# Patient Record
Sex: MALE | URBAN - METROPOLITAN AREA
[De-identification: names, ages, dates, MRNs, and addresses within clinical notes are randomized per-mention and may not be internally consistent; named-entity substitution may affect disease eponyms.]

---

## 2017-11-28 ENCOUNTER — APPOINTMENT (RX ONLY)
Dept: URBAN - METROPOLITAN AREA CLINIC 87 | Facility: CLINIC | Age: 32
Setting detail: DERMATOLOGY
End: 2017-11-28

## 2017-11-28 DIAGNOSIS — L70.0 ACNE VULGARIS: ICD-10-CM

## 2017-11-28 PROBLEM — F41.9 ANXIETY DISORDER, UNSPECIFIED: Status: ACTIVE | Noted: 2017-11-28

## 2017-11-28 PROBLEM — I63.50 CEREBRAL INFARCTION DUE TO UNSPECIFIED OCCLUSION OR STENOSIS OF UNSPECIFIED CEREBRAL ARTERY: Status: ACTIVE | Noted: 2017-11-28

## 2017-11-28 PROBLEM — G40.909 EPILEPSY, UNSPECIFIED, NOT INTRACTABLE, WITHOUT STATUS EPILEPTICUS: Status: ACTIVE | Noted: 2017-11-28

## 2017-11-28 PROBLEM — Z92.3 PERSONAL HISTORY OF IRRADIATION: Status: ACTIVE | Noted: 2017-11-28

## 2017-11-28 PROCEDURE — ? ORDER TESTS

## 2017-11-28 PROCEDURE — ? COUNSELING

## 2017-11-28 PROCEDURE — ? PRESCRIPTION

## 2017-11-28 PROCEDURE — ? OTHER

## 2017-11-28 PROCEDURE — 99202 OFFICE O/P NEW SF 15 MIN: CPT

## 2017-11-28 PROCEDURE — ? TREATMENT REGIMEN

## 2017-11-28 RX ORDER — ADAPALENE AND BENZOYL PEROXIDE 3; 25 MG/G; MG/G
GEL TOPICAL
Qty: 1 | Refills: 2 | Status: ERX | COMMUNITY
Start: 2017-11-28

## 2017-11-28 RX ORDER — CLINDAMYCIN PHOSPHATE 10 MG/1
SWAB TOPICAL
Qty: 1 | Refills: 4 | Status: ERX | COMMUNITY
Start: 2017-11-28

## 2017-11-28 RX ADMIN — CLINDAMYCIN PHOSPHATE: 10 SWAB TOPICAL at 16:51

## 2017-11-28 RX ADMIN — ADAPALENE AND BENZOYL PEROXIDE: 3; 25 GEL TOPICAL at 16:51

## 2017-11-28 ASSESSMENT — LOCATION ZONE DERM
LOCATION ZONE: TRUNK
LOCATION ZONE: FACE

## 2017-11-28 ASSESSMENT — LOCATION SIMPLE DESCRIPTION DERM
LOCATION SIMPLE: LEFT UPPER BACK
LOCATION SIMPLE: CHEST
LOCATION SIMPLE: RIGHT CHEEK

## 2017-11-28 ASSESSMENT — LOCATION DETAILED DESCRIPTION DERM
LOCATION DETAILED: RIGHT MEDIAL INFERIOR CHEST
LOCATION DETAILED: LEFT SUPERIOR LATERAL UPPER BACK
LOCATION DETAILED: RIGHT CENTRAL MALAR CHEEK
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
## 2017-11-28 NOTE — PROCEDURE: TREATMENT REGIMEN
Detail Level: Zone
Initiate Treatment: Epiduo Forte gel apply sparingly at night, Clinda P swab to face/back/chest every morning
Continue Regimen: Bactrim as recommended by PCP
Plan: Ok to continue BP wash 1-2 daily  and Bactrim abx as recommended by PCP, Dr. Evans for cosmetic treatments-name and number given

## 2020-02-29 ENCOUNTER — HOSPITAL ENCOUNTER (EMERGENCY)
Dept: HOSPITAL 97 - ER | Age: 35
Discharge: HOME | End: 2020-02-29
Payer: COMMERCIAL

## 2020-02-29 VITALS — SYSTOLIC BLOOD PRESSURE: 168 MMHG | DIASTOLIC BLOOD PRESSURE: 105 MMHG

## 2020-02-29 VITALS — OXYGEN SATURATION: 100 % | TEMPERATURE: 98.5 F

## 2020-02-29 DIAGNOSIS — S23.3XXA: ICD-10-CM

## 2020-02-29 DIAGNOSIS — F17.210: ICD-10-CM

## 2020-02-29 DIAGNOSIS — Z88.5: ICD-10-CM

## 2020-02-29 DIAGNOSIS — S13.4XXA: Primary | ICD-10-CM

## 2020-02-29 LAB
ALBUMIN SERPL BCP-MCNC: 4.5 G/DL (ref 3.4–5)
ALP SERPL-CCNC: 97 U/L (ref 45–117)
ALT SERPL W P-5'-P-CCNC: 22 U/L (ref 12–78)
AST SERPL W P-5'-P-CCNC: 15 U/L (ref 15–37)
BUN BLD-MCNC: 16 MG/DL (ref 7–18)
GLUCOSE SERPLBLD-MCNC: 123 MG/DL (ref 74–106)
HCT VFR BLD CALC: 45.2 % (ref 39.6–49)
LYMPHOCYTES # SPEC AUTO: 1.2 K/UL (ref 0.7–4.9)
PMV BLD: 8.4 FL (ref 7.6–11.3)
POTASSIUM SERPL-SCNC: 4.1 MMOL/L (ref 3.5–5.1)
RBC # BLD: 4.95 M/UL (ref 4.33–5.43)

## 2020-02-29 PROCEDURE — 85025 COMPLETE CBC W/AUTO DIFF WBC: CPT

## 2020-02-29 PROCEDURE — 99284 EMERGENCY DEPT VISIT MOD MDM: CPT

## 2020-02-29 PROCEDURE — 36415 COLL VENOUS BLD VENIPUNCTURE: CPT

## 2020-02-29 PROCEDURE — 96375 TX/PRO/DX INJ NEW DRUG ADDON: CPT

## 2020-02-29 PROCEDURE — 72125 CT NECK SPINE W/O DYE: CPT

## 2020-02-29 PROCEDURE — 96374 THER/PROPH/DIAG INJ IV PUSH: CPT

## 2020-02-29 PROCEDURE — 72128 CT CHEST SPINE W/O DYE: CPT

## 2020-02-29 PROCEDURE — 70450 CT HEAD/BRAIN W/O DYE: CPT

## 2020-02-29 PROCEDURE — 80076 HEPATIC FUNCTION PANEL: CPT

## 2020-02-29 PROCEDURE — 80048 BASIC METABOLIC PNL TOTAL CA: CPT

## 2020-02-29 NOTE — EDPHYS
Physician Documentation                                                                           

 [facility]                                                                 

Name: Burke Silvestre                                                                              

Age: 34 yrs                                                                                       

Sex: Male                                                                                         

: 1985                                                                                   

MRN: [id number]                                                                                   

Arrival Date: 2020                                                                          

Time: 19:21                                                                                       

Account#: [id number]                                                                            

Bed 4                                                                                             

Private MD:                                                                                       

ED Physician Benjamin Mcgregor                                                                     

HPI:                                                                                              

                                                                                             

02:06 This 34 yrs old  Male presents to ER via Wheelchair with complaints of Neck    tw4 

      Injury, Possible Stroke.                                                                    

02:06 The patient or guardian complains of decreased range of motion, pain, that is acute.    tw4 

      The symptoms are located at the cervical spine. Onset: The symptoms/episode                 

      began/occurred just prior to arrival, today. Context: The problem was sustained at          

      home. Associated signs and symptoms: The patient has no apparent associated signs or        

      symptoms. The pain radiates to the left trapezius and left scapular area. Modifying         

      factors: The symptoms are alleviated by nothing. the symptoms are aggravated by             

      movement. The patient has not experienced similar symptoms in the past.                     

02:11 Pt has a history of chronic neck pain.                                                  tw4 

                                                                                                  

Historical:                                                                                       

- Allergies:                                                                                      

                                                                                             

20:09 Toradol;                                                                                ea  

                                                                                                  

- Immunization history:: Adult Immunizations up to date.                                          

- Social history:: Smoking status: Patient reports the use of cigarette tobacco                   

  products, smokes one-half pack cigarettes per day.                                              

                                                                                                  

                                                                                                  

ROS:                                                                                              

                                                                                             

02:06 Constitutional: Negative for fever, chills, and weight loss, Eyes: Negative for injury, tw4 

      pain, redness, and discharge, Cardiovascular: Negative for chest pain, palpitations,        

      and edema, Respiratory: Negative for shortness of breath, cough, wheezing, and              

      pleuritic chest pain, Abdomen/GI: Negative for abdominal pain, nausea, vomiting,            

      diarrhea, and constipation, Back: Negative for injury and pain, MS/Extremity: Negative      

      for injury and deformity, Skin: Negative for injury, rash, and discoloration.               

      Neck: Positive for pain with movement, pain at rest, Negative for injury or acute           

      deformity, mass.                                                                            

      Neuro: Positive for weakness.                                                               

                                                                                                  

Exam:                                                                                             

02:06 Constitutional:  This is a well developed, well nourished patient who is awake, alert,  tw4 

      and in no acute distress. Eyes:  Pupils equal round and reactive to light, extra-ocular     

      motions intact.  Lids and lashes normal.  Conjunctiva and sclera are non-icteric and        

      not injected.  Cornea within normal limits.  Periorbital areas with no swelling,            

      redness, or edema. Chest/axilla:  Normal chest wall appearance and motion.  Nontender       

      with no deformity.  No lesions are appreciated. Cardiovascular:  Regular rate and           

      rhythm with a normal S1 and S2.  No gallops, murmurs, or rubs.  Normal PMI, no JVD.  No     

      pulse deficits. Respiratory:  Lungs have equal breath sounds bilaterally, clear to          

      auscultation and percussion.  No rales, rhonchi or wheezes noted.  No increased work of     

      breathing, no retractions or nasal flaring. Abdomen/GI:  Soft, non-tender, with normal      

      bowel sounds.  No distension or tympany.  No guarding or rebound.  No evidence of           

      tenderness throughout. Back:  No spinal tenderness.  No costovertebral tenderness.          

      Full range of motion. MS/ Extremity:  Pulses equal, no cyanosis.  Neurovascular intact.     

       Full, normal range of motion. Neuro:  Awake and alert, GCS 15, oriented to person,         

      place, time, and situation.  Cranial nerves II-XII grossly intact.  Motor strength 5/5      

      in all extremities.  Sensory grossly intact.  Cerebellar exam normal.  Normal gait.         

02:06 Neck: External neck: tenderness, that is moderate, of the  left mid cervical area,          

      right mid cervical area and lower cervical area.                                            

                                                                                                  

Vital Signs:                                                                                      

                                                                                             

20:22  / 113; Pulse 91; Resp 18; Temp 98.5; Pulse Ox 100% on R/A; Pain 10/10;           jd3 

20:38  / 112; Pulse 85; Resp 18; Pulse Ox 100% on R/A;                                  mg2 

20:41 Pain 8/10;                                                                              jd3 

22:26  / 105; Pulse 85; Resp 17 S; Pulse Ox 100% on R/A;                                jd3 

                                                                                                  

MDM:                                                                                              

19:34 Patient medically screened.                                                             tw4 

                                                                                             

02:06 Data reviewed: vital signs, nurses notes. Data interpreted: Pulse oximetry:             tw4 

      Interpretation: normal. Counseling: I had a detailed discussion with the patient and/or     

      guardian regarding: the historical points, exam findings, and any diagnostic results        

      supporting the discharge/admit diagnosis. Medication response: morphine partially           

      relieved the patient's pain, valium. Response to treatment: the patient's symptoms have     

      markedly improved after treatment, and as a result, I will discharge patient. Special       

      discussion: I discussed with the patient/guardian in detail that at this point there is     

      no indication for admission to the hospital. It is understood, however, that if the         

      symptoms persist or worsen the patient needs to return immediately for re-evaluation.       

                                                                                                  

                                                                                             

19:33 Order name: Basic Metabolic Panel; Complete Time: 21:12                                                                                                                              

21:12 Interpretation: Normal except: GLUC 123; GFR 75.                                                                                                                                     

19:33 Order name: CBC with Diff; Complete Time: 21:12                                                                                                                                      

21:13 Interpretation: Normal except: WBC 12.2; LYM% 10.2; KERRY% 82.9; NEUT A 10.1.                                                                                                          

19:33 Order name: Creatinine for Radiology; Complete Time: 21:12                                                                                                                           

21:13 Interpretation: Within normal limits: CRE 1.09.                                                                                                                                      

19:33 Order name: Hepatic Function; Complete Time: 21:12                                                                                                                                   

21:13 Interpretation: Normal except: TP 8.3; GLOB 3.8.                                                                                                                                     

19:33 Order name: CT Thoracic Spine Wo Cont; Complete Time: 20:34                                                                                                                          

19:33 Order name: IV Saline Lock; Complete Time: 20:23                                                                                                                                     

19:33 Order name: Labs collected and sent; Complete Time: 20:23                                                                                                                            

19:45 Order name: Head C Spine Mpr Wo Con; Complete Time: 20:34                               EDMS

                                                                                                  

Administered Medications:                                                                         

                                                                                             

20:23 Drug: Diazepam 1 mg Route: IVP; Site: left antecubital;                                 mg2 

20:42 Follow up: Response: No adverse reaction                                                jd3 

20:23 Drug: morphine 4 mg Route: IVP; Site: left antecubital;                                 mg2 

20:41 Follow up: Pain 8/10 Adult; Response: No adverse reaction; Pain is decreased; RASS:     jd3 

      Alert and Calm (0)                                                                          

20:23 Drug: Zofran (Ondansetron) 4 mg Route: IVP; Site: left antecubital;                     mg2 

20:41 Follow up: Response: No adverse reaction                                                jd3 

21:19 Drug: fentaNYL (PF) 25 mcg Route: IVP; Site: left antecubital;                          mg2 

22:19 Follow up: Response: No adverse reaction; RASS: Alert and Calm (0)                      jd3 

                                                                                                  

                                                                                                  

Disposition:                                                                                      

02/29/20 21:52 Discharged to Home. Impression: Other chronic pain, Sprain of ligaments of         

  cervical spine, Sprain of ligaments of thoracic spine.                                          

- Condition is Stable.                                                                            

- Discharge Instructions: Chronic Pain, Cervical Sprain.                                          

- Prescriptions for Neurontin 300 mg Oral Capsule - take 1 capsule by ORAL route At               

  bedtime; 20 capsule.                                                                            

- Medication Reconciliation Form, Thank You Letter, Antibiotic Education, Prescription            

  Opioid Use form.                                                                                

- Follow up: Private Physician; When: Upon discharge from the Emergency Department;               

  Reason: Recheck today's complaints, Continuance of care, Re-evaluation by your                  

  physician. Follow up: Jonathan Erickson MD; When: Upon discharge from the Emergency                

  Department; Reason: If symptoms return, Recheck today's complaints, Continuance of              

  care. Follow up: Perfecto Angelo MD; When: Upon discharge from the Emergency                    

  Department; Reason: If symptoms return, Recheck today's complaints, Continuance of              

  care, Re-evaluation by your physician. Follow up: Oscar Thomason MD; When: Upon               

  discharge from the Emergency Department; Reason: If symptoms return, Recheck today's            

  complaints, Continuance of care, Re-evaluation by your physician. Follow up:                    

  Chapin Tristan MD; When: Upon discharge from the Emergency Department; Reason: If           

  symptoms return, Recheck today's complaints, Continuance of care, Re-evaluation by              

  your physician. Follow up: Mario Holcomb MD; When: Upon discharge from the                  

  Emergency Department; Reason: If symptoms return, Recheck today's complaints,                   

  Continuance of care, Re-evaluation by your physician. Follow up: Go Hill MD;              

  When: Upon discharge from the Emergency Department; Reason: If symptoms return,                 

  Recheck today's complaints, Continuance of care, Re-evaluation by your physician.               

                                                                                                  

                                                                                                  

                                                                                                  

Signatures:                                                                                       

Dispatcher [employer]                           [facility]                                                 

Florinda Royal RN                      Theron Yuen ea, RN RN   jd3                                                  

Benjamin Mcgregor MD MD   tw4                                                  

Jaylon Moser RN RN   mg2                                                  

                                                                                                  

Corrections: (The following items were deleted from the chart)                                    

19:45 19:34 C Spine Wo Con+CT.RAD.BRZ ordered. [facility]

22:27 21:52 2020 21:52 Discharged to Home. Impression: Other chronic pain; Sprain of    jd3 

      ligaments of cervical spine; Sprain of ligaments of thoracic spine. Condition is            

      Stable. Forms are Medication Reconciliation Form, Thank You Letter, Antibiotic              

      Education, Prescription Opioid Use. Follow up: Private Physician; When: Upon discharge      

      from the Emergency Department; Reason: Recheck today's complaints, Continuance of care,     

      Re-evaluation by your physician. Follow up: Dr. Jonathan Erickson; When: Upon discharge from     

      the Emergency Department; Reason: If symptoms return, Recheck today's complaints,           

      Continuance of care. Follow up: Perfecto Angelo; When: Upon discharge from the Emergency      

      Department; Reason: If symptoms return, Recheck today's complaints, Continuance of          

      care, Re-evaluation by your physician. Follow up: Oscar Thomason; When: Upon discharge      

      from the Emergency Department; Reason: If symptoms return, Recheck today's complaints,      

      Continuance of care, Re-evaluation by your physician. Follow up: Chapin Tristan;          

      When: Upon discharge from the Emergency Department; Reason: If symptoms return, Recheck     

      today's complaints, Continuance of care, Re-evaluation by your physician. Follow up:        

      Mario Holcomb; When: Upon discharge from the Emergency Department; Reason: If             

      symptoms return, Recheck today's complaints, Continuance of care, Re-evaluation by your     

      physician. Follow up: Go Hill; When: Upon discharge from the Emergency Department;      

      Reason: If symptoms return, Recheck today's complaints, Continuance of care,                

      Re-evaluation by your physician. tw4                                                        

                                                                                                  

**************************************************************************************************
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
## 2020-02-29 NOTE — RAD REPORT
EXAM DESCRIPTION:  CT - Thoracic Spine W/o Cont - 2/29/2020 7:53 pm

 

CLINICAL HISTORY:  Radiculopathy.

PAIN

 

COMPARISON:  Head C Spine Mpr Wo Con dated 2/29/2020

 

TECHNIQUE:  Axial CT imaging through the thoracic spine was performed with coronal and sagittal re-fo
rmatted images.

 

All CT scans are performed using dose optimization technique as appropriate and may include automated
 exposure control or mA/KV adjustment according to patient size.

 

FINDINGS:  Vertebral body heights and disc spaces are maintained. A compression fracture is not prese
nt. No significant disc space narrowing.

 

Thoracic spine alignment is within normal limits. No paraspinal masses or hematoma.

 

Intervertebral disc detail is inherently limited on CT without gross findings of canal compromise.

 

Multiple small stones are present in the calices of both kidneys.

 

IMPRESSION:  No acute thoracic spine abnormality discerned.

 

Bilateral nephrolithiasis.

## 2020-02-29 NOTE — ER
Nurse's Notes                                                                                     

 [facility]                                                                 

Name: Burke Silvestre                                                                              

Age: 34 yrs                                                                                       

Sex: Male                                                                                         

: 1985                                                                                   

MRN: [id number]                                                                                   

Arrival Date: 2020                                                                          

Time: 19:21                                                                                       

Account#: [id number]                                                                            

Bed 4                                                                                             

Private MD:                                                                                       

Diagnosis: Other chronic pain;Sprain of ligaments of cervical spine;Sprain of ligaments of        

  thoracic spine                                                                                  

                                                                                                  

Presentation:                                                                                     

                                                                                             

19:21 Chief complaint: Patient states: Reports this morning he started having neck pain,      ea  

      numbness and tingling to faheem lower extremities. Pt reports he has had a history of          

      subarachnoid hemorrhage in the past and has chronic neck pain from an injury in the         

      past. Coronavirus screen: The patient has NOT traveled to [place] in the past 14 days.        

      Ebola Screen: No symptoms or risks identified at this time. Initial Sepsis Screen: Does     

      the patient meet any 2 criteria? No. Patient's initial sepsis screen is negative. Does      

      the patient have a suspected source of infection? No. Patient's initial sepsis screen       

      is negative. Risk Assessment: Do you want to hurt yourself or someone else? Patient         

      reports no desire to harm self or others.                                                   

19:21 Method Of Arrival: Wheelchair                                                           ea  

19:21 Acuity: ARPIT 3                                                                           ea  

19:21 Onset of symptoms was 2020.                                                ea  

                                                                                                  

Historical:                                                                                       

- Allergies:                                                                                      

20:09 Toradol;                                                                                ea  

                                                                                                  

- Immunization history:: Adult Immunizations up to date.                                          

- Social history:: Smoking status: Patient reports the use of cigarette tobacco                   

  products, smokes one-half pack cigarettes per day.                                              

                                                                                                  

                                                                                                  

Screenin:23 Abuse screen: Denies threats or abuse. Nutritional screening: No deficits noted.        ea  

      Tuberculosis screening: No symptoms or risk factors identified. Fall Risk None              

      identified.                                                                                 

                                                                                                  

Assessment:                                                                                       

20:13 General: Appears in no apparent distress. uncomfortable, well groomed, Behavior is      jd3 

      calm, cooperative, crying. Pain: Complains of pain in left trapezius, right trapezius,      

      left scapular area, right scapular area and thoracic area Pain radiates to left arm         

      Pain currently is 10 out of 10 on a pain scale. Neuro: Level of Consciousness is awake,     

      alert, obeys commands, Oriented to person, place, time, situation,  are equal          

      bilaterally Moves all extremities. Speech is normal, Facial symmetry appears normal,        

      Facial symmetry: tongue is midline, Pupils are PERRLA, pupil size 3mm bilaterally.          

      Cardiovascular: Heart tones S1 S2 present Capillary refill < 3 seconds Patient's skin       

      is warm and dry. Respiratory: Airway is patent Trachea midline Respiratory effort is        

      even, unlabored, Breath sounds are clear bilaterally. GI: Abdomen is flat,                  

      non-distended, Bowel sounds present X 4 quads. Abd is soft and non tender X 4 quads.        

      : No signs and/or symptoms were reported regarding the genitourinary system. EENT: No     

      signs and/or symptoms were reported regarding the EENT system. Derm: Skin is intact, is     

      healthy with good turgor, Skin is dry, Skin is normal. Musculoskeletal: Circulation,        

      motion, and sensation intact. Range of motion: intact in all extremities.                   

21:20 Reassessment: Patient appears in no apparent distress at this time. Patient and/or      jd3 

      family updated on plan of care and expected duration. Pain level reassessed. Patient is     

      alert, oriented x 3, equal unlabored respirations, skin warm/dry/pink. Patient states       

      feeling better.                                                                             

22:26 Reassessment: Patient appears in no apparent distress at this time. Patient and/or      jd3 

      family updated on plan of care and expected duration. Pain level reassessed. Patient is     

      alert, oriented x 3, equal unlabored respirations, skin warm/dry/pink. Patient states       

      feeling better.                                                                             

                                                                                                  

Vital Signs:                                                                                      

20:22  / 113; Pulse 91; Resp 18; Temp 98.5; Pulse Ox 100% on R/A; Pain 10/10;           jd3 

20:38  / 112; Pulse 85; Resp 18; Pulse Ox 100% on R/A;                                  mg2 

20:41 Pain 8/10;                                                                              jd3 

22:26  / 105; Pulse 85; Resp 17 S; Pulse Ox 100% on R/A;                                jd3 

                                                                                                  

ED Course:                                                                                        

19:21 Patient arrived in ED.                                                                  cl3 

19:21 Arm band placed on right wrist. Patient placed in an exam room, on a stretcher, on      ea  

      pulse oximetry.                                                                             

19:21 Patient has correct armband on for positive identification. Bed in low position. Call   ea  

      light in reach. Side rails up X2.                                                           

19:31 Benjamin Mcgregor MD is Attending Physician.                                            tw4 

19:34 Jaylon Moser, RN is Primary Nurse.                                                  mg2 

19:53 CT Thoracic Spine Wo Cont In Process Unspecified.                                       EDMS

19:53 Head C Spine Mpr Wo Con In Process Unspecified.                                         EDMS

20:09 Triage completed.                                                                       ea  

20:24 No provider procedures requiring assistance completed. Inserted saline lock: 20 gauge   mg2 

      in left antecubital area, using aseptic technique. Blood collected.                         

21:51 Jonathan Erickson MD is Referral Physician.                                                tw4 

21:51 Perfecto Angelo MD is Referral Physician.                                               tw4 

21:51 Oscar Thomason MD is Referral Physician.                                              tw4 

21:51 Chapin Tristan MD is Referral Physician.                                            tw4 

21:52 Mario Holcomb MD is Referral Physician.                                             tw4 

21:52 Go Hill MD is Referral Physician.                                                 tw4 

22:26 IV discontinued, intact, bleeding controlled, No redness/swelling at site. Pressure     jd3 

      dressing applied.                                                                           

                                                                                                  

Administered Medications:                                                                         

20:23 Drug: Diazepam 1 mg Route: IVP; Site: left antecubital;                                 mg2 

20:42 Follow up: Response: No adverse reaction                                                jd3 

20:23 Drug: morphine 4 mg Route: IVP; Site: left antecubital;                                 mg2 

20:41 Follow up: Pain 8/10 Adult; Response: No adverse reaction; Pain is decreased; RASS:     jd3 

      Alert and Calm (0)                                                                          

20:23 Drug: Zofran (Ondansetron) 4 mg Route: IVP; Site: left antecubital;                     mg2 

20:41 Follow up: Response: No adverse reaction                                                jd3 

21:19 Drug: fentaNYL (PF) 25 mcg Route: IVP; Site: left antecubital;                          mg2 

22:19 Follow up: Response: No adverse reaction; RASS: Alert and Calm (0)                      jd3 

                                                                                                  

                                                                                                  

Outcome:                                                                                          

21:52 Discharge ordered by MD.                                                                tw4 

22:25 Discharged to home via wheelchair, with family.                                         jd3 

22:25 Condition: stable                                                                           

22:25 Discharge instructions given to patient, Instructed on discharge instructions, follow       

      up and referral plans. medication usage, Demonstrated understanding of instructions,        

      follow-up care, medications, Prescriptions given X 1.                                       

22:27 Patient left the ED.                                                                    jd3 

                                                                                                  

Signatures:                                                                                       

Dispatcher [employer]                           EDMS                                                 

Florinda Royal RN RN ea Davies, Jonathon, RN RN   jBenjamin Castro MD MD   tw4                                                  

Jaylon Moser RN RN   mg2                                                  

Jona Caballero3                                                  

                                                                                                  

**************************************************************************************************